# Patient Record
Sex: FEMALE
[De-identification: names, ages, dates, MRNs, and addresses within clinical notes are randomized per-mention and may not be internally consistent; named-entity substitution may affect disease eponyms.]

---

## 2020-02-25 PROBLEM — Z00.00 ENCOUNTER FOR PREVENTIVE HEALTH EXAMINATION: Status: ACTIVE | Noted: 2020-02-25

## 2020-03-16 ENCOUNTER — APPOINTMENT (OUTPATIENT)
Dept: OTOLARYNGOLOGY | Facility: CLINIC | Age: 65
End: 2020-03-16
Payer: MEDICARE

## 2020-03-16 VITALS
DIASTOLIC BLOOD PRESSURE: 82 MMHG | BODY MASS INDEX: 22.15 KG/M2 | HEART RATE: 55 BPM | SYSTOLIC BLOOD PRESSURE: 157 MMHG | HEIGHT: 63 IN | WEIGHT: 125 LBS

## 2020-03-16 DIAGNOSIS — H91.23 SUDDEN IDIOPATHIC HEARING LOSS, BILATERAL: ICD-10-CM

## 2020-03-16 DIAGNOSIS — H91.93 UNSPECIFIED HEARING LOSS, BILATERAL: ICD-10-CM

## 2020-03-16 DIAGNOSIS — Z80.9 FAMILY HISTORY OF MALIGNANT NEOPLASM, UNSPECIFIED: ICD-10-CM

## 2020-03-16 DIAGNOSIS — Z78.9 OTHER SPECIFIED HEALTH STATUS: ICD-10-CM

## 2020-03-16 DIAGNOSIS — Z82.49 FAMILY HISTORY OF ISCHEMIC HEART DISEASE AND OTHER DISEASES OF THE CIRCULATORY SYSTEM: ICD-10-CM

## 2020-03-16 PROCEDURE — 92567 TYMPANOMETRY: CPT

## 2020-03-16 PROCEDURE — 92557 COMPREHENSIVE HEARING TEST: CPT

## 2020-03-16 PROCEDURE — 99204 OFFICE O/P NEW MOD 45 MIN: CPT

## 2020-03-16 PROCEDURE — 92584 ELECTROCOCHLEOGRAPHY: CPT

## 2020-03-16 RX ORDER — MAGNESIUM OXIDE/MAG AA CHELATE 300 MG
CAPSULE ORAL
Refills: 0 | Status: ACTIVE | COMMUNITY

## 2020-03-16 RX ORDER — UBIDECARENONE 200 MG
CAPSULE ORAL
Refills: 0 | Status: ACTIVE | COMMUNITY

## 2020-03-16 RX ORDER — MULTIVITAMIN
TABLET ORAL
Refills: 0 | Status: ACTIVE | COMMUNITY

## 2020-03-16 RX ORDER — BACILLUS COAGULANS/INULIN 1B-250 MG
CAPSULE ORAL
Refills: 0 | Status: ACTIVE | COMMUNITY

## 2020-03-16 RX ORDER — CALCIUM CARBONATE/VITAMIN D3 600 MG-10
TABLET ORAL
Refills: 0 | Status: ACTIVE | COMMUNITY

## 2020-03-16 RX ORDER — CHOLECALCIFEROL (VITAMIN D3) 25 MCG
TABLET ORAL
Refills: 0 | Status: ACTIVE | COMMUNITY

## 2020-03-16 RX ORDER — PREDNISONE 50 MG/1
TABLET ORAL
Refills: 0 | Status: ACTIVE | COMMUNITY

## 2020-03-16 NOTE — CONSULT LETTER
[Dear  ___] : Dear ~TEE, [Consult Letter:] : I had the pleasure of evaluating your patient, [unfilled]. [Please see my note below.] : Please see my note below. [Consult Closing:] : Thank you very much for allowing me to participate in the care of this patient.  If you have any questions, please do not hesitate to contact me. [Sincerely,] : Sincerely, [FreeTextEntry2] : Daljit Mobley MD [FreeTextEntry3] : Josafat Ridley MD, FACS\par Chair of Otolaryngology, [facility]\par Professor of Otolaryngology & Molecular Medicine, [employer] at [facility]\par \par

## 2020-03-16 NOTE — DATA REVIEWED
[de-identified] : Bilateral mild to profound sensorineural hearing loss\par Impedance testing reveals normal Type A tympanograms bilaterally\par Ecog - negative bilaterally\par Right-38%\par No significant change from prior audiogram\par  Left- 36%\par

## 2020-03-16 NOTE — HISTORY OF PRESENT ILLNESS
[de-identified] : 64 year old female referred by Dr. Daljit Mobley, ENT, for auto immune hearing loss.  States was diagnosed with bilateral hearing loss about 2.5 years ago.  Currently wearing bilateral hearing aids. Hearing fluctuates  - on prednisone 1st time 1.5 years ago - 60mg x 30 days then tapered.  Once got to 10mg hearing dropped again - tried IT - no help - by November hearing bad again - went up in pred - helped - saw Rheumatology - referred to other Rheum.  plan to switch to MTX.  Patient with no AI disease and no AI disease in family - no joint pain, skin rashes, no eye irritation   - has had episodes of vertigo but not at time of HL - no headaches.  Also tried naltrexone, acyclovir,  - no diuretic use.  No formal allergy testing  - had MRI 1 years ago - "MRI fine" - to get disc

## 2020-03-16 NOTE — REASON FOR VISIT
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 Daljit Mobley, ENT, for auto immune hearing loss

## 2020-05-21 ENCOUNTER — APPOINTMENT (OUTPATIENT)
Dept: OTOLARYNGOLOGY | Facility: CLINIC | Age: 65
End: 2020-05-21
Payer: MEDICARE

## 2020-05-21 DIAGNOSIS — H83.8X3 OTHER SPECIFIED DISEASES OF INNER EAR, BILATERAL: ICD-10-CM

## 2020-05-21 PROCEDURE — 99213 OFFICE O/P EST LOW 20 MIN: CPT | Mod: 95

## 2020-05-21 RX ORDER — PREDNISONE 5 MG/1
5 TABLET ORAL
Qty: 170 | Refills: 0 | Status: ACTIVE | COMMUNITY
Start: 2020-05-21 | End: 1900-01-01

## 2020-05-21 NOTE — HISTORY OF PRESENT ILLNESS
[Home] : at home, [unfilled] , at the time of the visit. [Medical Office: ([facility])___] : at the medical office located in  [Verbal consent obtained from patient] : the patient, [unfilled] [de-identified] : [facility] visit - Patient with steroid dependent AIED - on 30mg of prednisone - was not able to go on anakinra because rheum cancelled appointment because of COVID - prior rheum did not want her to taper steorids - has similarly not seen Allergist because of COVID - has appt 6/8 - will send records - denies hearing flux, vertigo, skin rashes or eye irritation - has occasional left leg pain that radiates down to foot - intermittent -

## 2020-06-12 ENCOUNTER — APPOINTMENT (OUTPATIENT)
Dept: OTOLARYNGOLOGY | Facility: CLINIC | Age: 65
End: 2020-06-12

## 2020-11-06 LAB
25(OH)D3 SERPL-MCNC: 58.6 NG/ML
CRP SERPL HS-MCNC: 1.04 MG/L
ERYTHROCYTE [SEDIMENTATION RATE] IN BLOOD BY WESTERGREN METHOD: 8 MM/HR
GLIADIN IGA SER QL: 5.6 UNITS
GLIADIN IGG SER QL: <5 UNITS
GLIADIN PEPTIDE IGA SER-ACNC: NEGATIVE
GLIADIN PEPTIDE IGG SER-ACNC: NEGATIVE
THYROGLOB AB SERPL-ACNC: <20 IU/ML
THYROPEROXIDASE AB SERPL IA-ACNC: <10 IU/ML
TSH SERPL-ACNC: 0.91 UIU/ML